# Patient Record
Sex: MALE | Race: BLACK OR AFRICAN AMERICAN
[De-identification: names, ages, dates, MRNs, and addresses within clinical notes are randomized per-mention and may not be internally consistent; named-entity substitution may affect disease eponyms.]

---

## 2020-12-02 ENCOUNTER — HOSPITAL ENCOUNTER (OUTPATIENT)
Dept: HOSPITAL 92 - LABBT | Age: 60
Discharge: HOME | End: 2020-12-02
Attending: NEUROLOGICAL SURGERY
Payer: COMMERCIAL

## 2020-12-02 DIAGNOSIS — Z20.828: ICD-10-CM

## 2020-12-02 DIAGNOSIS — Z01.818: Primary | ICD-10-CM

## 2020-12-02 DIAGNOSIS — M54.16: ICD-10-CM

## 2020-12-02 LAB
ANION GAP SERPL CALC-SCNC: 17 MMOL/L (ref 10–20)
BUN SERPL-MCNC: 18 MG/DL (ref 8.4–25.7)
CALCIUM SERPL-MCNC: 9.6 MG/DL (ref 7.8–10.44)
CHLORIDE SERPL-SCNC: 98 MMOL/L (ref 98–107)
CO2 SERPL-SCNC: 28 MMOL/L (ref 22–29)
CREAT CL PREDICTED SERPL C-G-VRATE: 0 ML/MIN (ref 70–130)
GLUCOSE SERPL-MCNC: 100 MG/DL (ref 70–105)
HGB BLD-MCNC: 15.5 G/DL (ref 14–18)
MCH RBC QN AUTO: 29.2 PG (ref 27–33)
MCV RBC AUTO: 90.6 FL (ref 80–100)
PLATELET # BLD AUTO: 375 10X3/UL (ref 130–400)
POTASSIUM SERPL-SCNC: 4.5 MMOL/L (ref 3.5–5.1)
RBC # BLD AUTO: 5.3 10X6/UL (ref 4.4–5.8)
SODIUM SERPL-SCNC: 138 MMOL/L (ref 136–145)
WBC # BLD AUTO: 9.2 10X3/UL (ref 4.5–11)

## 2020-12-02 PROCEDURE — U0003 INFECTIOUS AGENT DETECTION BY NUCLEIC ACID (DNA OR RNA); SEVERE ACUTE RESPIRATORY SYNDROME CORONAVIRUS 2 (SARS-COV-2) (CORONAVIRUS DISEASE [COVID-19]), AMPLIFIED PROBE TECHNIQUE, MAKING USE OF HIGH THROUGHPUT TECHNOLOGIES AS DESCRIBED BY CMS-2020-01-R: HCPCS

## 2020-12-02 PROCEDURE — 80048 BASIC METABOLIC PNL TOTAL CA: CPT

## 2020-12-02 PROCEDURE — 87635 SARS-COV-2 COVID-19 AMP PRB: CPT

## 2020-12-02 PROCEDURE — 93010 ELECTROCARDIOGRAM REPORT: CPT

## 2020-12-02 PROCEDURE — 93005 ELECTROCARDIOGRAM TRACING: CPT

## 2020-12-02 PROCEDURE — 85027 COMPLETE CBC AUTOMATED: CPT

## 2020-12-03 NOTE — EKG
Test Reason : 

Blood Pressure : ***/*** mmHG

Vent. Rate : 073 BPM     Atrial Rate : 073 BPM

   P-R Int : 180 ms          QRS Dur : 084 ms

    QT Int : 382 ms       P-R-T Axes : [phone] degrees

   QTc Int : 420 ms

 

Normal sinus rhythm

Nonspecific T wave abnormality

Abnormal ECG

No previous ECGs available

Confirmed by DR. MANPREET RIDDLE ([id number]) on 12/3/2020 7:07:19 AM

 

Referred By:  DEVYN           Confirmed By:DR. MANPREET RIDDLE

## 2020-12-07 ENCOUNTER — HOSPITAL ENCOUNTER (OUTPATIENT)
Dept: HOSPITAL 92 - SDC | Age: 60
Discharge: HOME | End: 2020-12-07
Attending: NEUROLOGICAL SURGERY
Payer: COMMERCIAL

## 2020-12-07 VITALS — BODY MASS INDEX: 42.5 KG/M2

## 2020-12-07 DIAGNOSIS — Z88.8: ICD-10-CM

## 2020-12-07 DIAGNOSIS — E78.5: ICD-10-CM

## 2020-12-07 DIAGNOSIS — M10.9: ICD-10-CM

## 2020-12-07 DIAGNOSIS — N40.0: ICD-10-CM

## 2020-12-07 DIAGNOSIS — I10: ICD-10-CM

## 2020-12-07 DIAGNOSIS — M19.90: ICD-10-CM

## 2020-12-07 DIAGNOSIS — Z79.84: ICD-10-CM

## 2020-12-07 DIAGNOSIS — M54.16: Primary | ICD-10-CM

## 2020-12-07 DIAGNOSIS — E11.9: ICD-10-CM

## 2020-12-07 DIAGNOSIS — Z79.899: ICD-10-CM

## 2020-12-07 PROCEDURE — 0SG00AJ FUSION OF LUMBAR VERTEBRAL JOINT WITH INTERBODY FUSION DEVICE, POSTERIOR APPROACH, ANTERIOR COLUMN, OPEN APPROACH: ICD-10-PCS | Performed by: NEUROLOGICAL SURGERY

## 2020-12-07 PROCEDURE — C1768 GRAFT, VASCULAR: HCPCS

## 2020-12-07 PROCEDURE — 0ST20ZZ RESECTION OF LUMBAR VERTEBRAL DISC, OPEN APPROACH: ICD-10-PCS | Performed by: NEUROLOGICAL SURGERY

## 2020-12-07 PROCEDURE — C1713 ANCHOR/SCREW BN/BN,TIS/BN: HCPCS

## 2020-12-07 PROCEDURE — 76000 FLUOROSCOPY <1 HR PHYS/QHP: CPT

## 2020-12-07 NOTE — OP
DATE OF PROCEDURE:  12/07/2020



ASSISTANT:  Liana Cooper PA-C



PROCEDURES PERFORMED:  Right L4-5 laminectomy, facetectomy, foraminotomy, and

diskectomy; interbody arthrodesis; intervertebral biomechanical device; local

morselized autograft; demineralized bone matrix; posterolateral arthrodesis; pedicle

screw instrumentation, right L4-5. 



DESCRIPTION OF PROCEDURE:  The patient was brought to the operating room and

intubated.  He was rolled in a prone position on gel-filled chest rolls.  An

incision was made exposing L4-5 and the level was confirmed by x-ray.  We performed

a right L4-5 laminectomy, facetectomy, foraminotomy, diskectomy, completely

decompressing right L4 and right L5.  The disk itself was then completely removed

and bony endplates decorticated for the purpose of arthrodesis.  An

appropriate-sized intervertebral biomechanical PEEK device was brought into the

field.  It was filled with demineralized bone matrix, local morselized autograft,

and tapped into place securely at L4-5.  Next, pedicle screws were placed at right

L4 and right L5 using lateral fluoroscopic guidance.  The position was confirmed by

x-ray.  The keegan was secured between the screws, connected by nuts, which were final

tightened.  The wound was extensively irrigated.  MAC hemostasis was secured.  A

combination of demineralized bone matrix and local morselized autograft was laid

over the lamina and posterolateral surfaces for the purpose of arthrodesis.

Vancomycin powder was applied and the wound was closed in anatomic layers. 







Job ID:  [id number]